# Patient Record
Sex: MALE | URBAN - METROPOLITAN AREA
[De-identification: names, ages, dates, MRNs, and addresses within clinical notes are randomized per-mention and may not be internally consistent; named-entity substitution may affect disease eponyms.]

---

## 2018-07-18 ENCOUNTER — IMPORTED ENCOUNTER (OUTPATIENT)
Dept: URBAN - METROPOLITAN AREA CLINIC 9 | Facility: CLINIC | Age: 58
End: 2018-07-18

## 2018-08-24 ENCOUNTER — IMPORTED ENCOUNTER (OUTPATIENT)
Dept: URBAN - METROPOLITAN AREA CLINIC 9 | Facility: CLINIC | Age: 58
End: 2018-08-24

## 2018-09-28 ENCOUNTER — IMPORTED ENCOUNTER (OUTPATIENT)
Dept: URBAN - METROPOLITAN AREA CLINIC 9 | Facility: CLINIC | Age: 58
End: 2018-09-28

## 2018-10-23 NOTE — PATIENT DISCUSSION
Discussed the importance of blood pressure control in the prevention of ocular complications.  BP yesterday with PCP was 116/68 (on BP meds).

## 2018-11-02 ENCOUNTER — IMPORTED ENCOUNTER (OUTPATIENT)
Dept: URBAN - METROPOLITAN AREA CLINIC 9 | Facility: CLINIC | Age: 58
End: 2018-11-02

## 2020-02-28 NOTE — PATIENT DISCUSSION
clean LIDS OU daily with Sterilid for one month.  Maxitrol devin to OS lid at bed for two weeks.  hot compress to lid with lid massage every day ongoing.

## 2020-07-25 ENCOUNTER — TELEPHONE ENCOUNTER (OUTPATIENT)
Dept: URBAN - METROPOLITAN AREA CLINIC 13 | Facility: CLINIC | Age: 60
End: 2020-07-25

## 2020-07-26 ENCOUNTER — TELEPHONE ENCOUNTER (OUTPATIENT)
Dept: URBAN - METROPOLITAN AREA CLINIC 13 | Facility: CLINIC | Age: 60
End: 2020-07-26

## 2021-10-16 ASSESSMENT — VISUAL ACUITY
OS_SC: 20/30 SN
OS_SC: 20/30 SN
OD_SC: 20/30 SN
OS_SC: 20/30 SN
OD_SC: 20/30 SN
OS_SC: 20/30 SN
OD_SC: 20/30 SN
OD_SC: 20/30 SN